# Patient Record
Sex: FEMALE | Race: WHITE
[De-identification: names, ages, dates, MRNs, and addresses within clinical notes are randomized per-mention and may not be internally consistent; named-entity substitution may affect disease eponyms.]

---

## 2017-08-22 ENCOUNTER — HOSPITAL ENCOUNTER (OUTPATIENT)
Dept: HOSPITAL 61 - KCIC MAMMO | Age: 47
Discharge: HOME | End: 2017-08-22
Attending: INTERNAL MEDICINE
Payer: COMMERCIAL

## 2017-08-22 DIAGNOSIS — Z12.31: Primary | ICD-10-CM

## 2017-08-22 PROCEDURE — 77063 BREAST TOMOSYNTHESIS BI: CPT

## 2017-08-24 NOTE — KCIC
DATE: 8/23/2017



EXAM: MAMMO NOHELIA SCREENING BILATERAL



HISTORY: 46-year-old female for routine screening



COMPARISON: 2014 and 2012 mammograms



This study was interpreted with the benefit of Computerized Aided Detection (CAD
).



FINDINGS:



Breast Density: SCATTERED The breast parenchyma shows scattered fibroglandular 
densities. Breast parenchyma level B. 



There is a 5 mm ill-defined nodule within the right upper outer breast in the 
prepectoral soft tissue approximately 12 cm from the nipple. No suspicious 
calcifications.

Left breast show no abnormality.





IMPRESSION: Small ill-defined nodular density within right breast upper outer 
quadrant in the prepectoral soft tissue. Targeted Ultrasound recommended for 
further evaluation.





BI-RADS CATEGORY: 0 INCOMPLETE: NEED ADDITIONAL IMAGING EVAULATION AND/OR PRIOR 
MAMMOGRAMS FOR COMPARISON



RECOMMENDED FOLLOW-UP: 



PQRS compliance statement: Patient information was entered into a reminder 
system with a target due date for the next mammogram.



Mammography is a sensitive method for finding small breast cancers, but it does 
not detect them all and is not a substitute for careful clinical examination. A 
negative mammogram does not negate a clinically suspicious finding and should 
not result in delay in biopsying a clinically suspicious abnormality.



"Our facility is accredited by the [employer] Mammography 
Program."
MTDD

## 2017-08-25 ENCOUNTER — HOSPITAL ENCOUNTER (OUTPATIENT)
Dept: HOSPITAL 61 - US | Age: 47
Discharge: HOME | End: 2017-08-25
Attending: INTERNAL MEDICINE
Payer: COMMERCIAL

## 2017-08-25 DIAGNOSIS — R92.8: Primary | ICD-10-CM

## 2017-08-25 PROCEDURE — 76641 ULTRASOUND BREAST COMPLETE: CPT

## 2017-08-25 NOTE — RAD
Ultrasound of the right breast



Indication: Callback



Technique: Targeted ultrasound of the right breast.



Comparison: Is mammogram from 8/22/2017.



Findings:

No abnormal cystic or solid lesions were identified from 9:00 to 12:00 area on

the ultrasound.



Impression:

No solid or cystic lesions seen on the targeted ultrasound in the region of

abnormality seen on prior mammogram.



BI-RADS 3: Probably benign. Follow-up right breast mammogram in 6 months.

## 2021-07-07 ENCOUNTER — HOSPITAL ENCOUNTER (EMERGENCY)
Dept: HOSPITAL 61 - ER | Age: 51
Discharge: HOME | End: 2021-07-07
Payer: COMMERCIAL

## 2021-07-07 VITALS — WEIGHT: 189.6 LBS | BODY MASS INDEX: 28.73 KG/M2 | HEIGHT: 68 IN

## 2021-07-07 VITALS — SYSTOLIC BLOOD PRESSURE: 150 MMHG | DIASTOLIC BLOOD PRESSURE: 88 MMHG

## 2021-07-07 DIAGNOSIS — Z88.8: ICD-10-CM

## 2021-07-07 DIAGNOSIS — R07.2: Primary | ICD-10-CM

## 2021-07-07 LAB
ALBUMIN SERPL-MCNC: 4 G/DL (ref 3.4–5)
ALBUMIN/GLOB SERPL: 1.1 {RATIO} (ref 1–1.7)
ALP SERPL-CCNC: 55 U/L (ref 46–116)
ALT SERPL-CCNC: 23 U/L (ref 14–59)
ANION GAP SERPL CALC-SCNC: 5 MMOL/L (ref 6–14)
AST SERPL-CCNC: 17 U/L (ref 15–37)
BASOPHILS # BLD AUTO: 0 X10^3/UL (ref 0–0.2)
BASOPHILS NFR BLD: 1 % (ref 0–3)
BILIRUB SERPL-MCNC: 0.3 MG/DL (ref 0.2–1)
BUN SERPL-MCNC: 10 MG/DL (ref 7–20)
BUN/CREAT SERPL: 14 (ref 6–20)
CALCIUM SERPL-MCNC: 9.4 MG/DL (ref 8.5–10.1)
CHLORIDE SERPL-SCNC: 107 MMOL/L (ref 98–107)
CO2 SERPL-SCNC: 33 MMOL/L (ref 21–32)
CREAT SERPL-MCNC: 0.7 MG/DL (ref 0.6–1)
EOSINOPHIL NFR BLD: 0.1 X10^3/UL (ref 0–0.7)
EOSINOPHIL NFR BLD: 1 % (ref 0–3)
ERYTHROCYTE [DISTWIDTH] IN BLOOD BY AUTOMATED COUNT: 14.7 % (ref 11.5–14.5)
GFR SERPLBLD BASED ON 1.73 SQ M-ARVRAT: 88.2 ML/MIN
GLUCOSE SERPL-MCNC: 88 MG/DL (ref 70–99)
HCT VFR BLD CALC: 34.8 % (ref 36–47)
HGB BLD-MCNC: 11.8 G/DL (ref 12–15.5)
LIPASE: 202 U/L (ref 73–393)
LYMPHOCYTES # BLD: 2.1 X10^3/UL (ref 1–4.8)
LYMPHOCYTES NFR BLD AUTO: 44 % (ref 24–48)
MCH RBC QN AUTO: 29 PG (ref 25–35)
MCHC RBC AUTO-ENTMCNC: 34 G/DL (ref 31–37)
MCV RBC AUTO: 86 FL (ref 79–100)
MONO #: 0.3 X10^3/UL (ref 0–1.1)
MONOCYTES NFR BLD: 6 % (ref 0–9)
NEUT #: 2.3 X10^3/UL (ref 1.8–7.7)
NEUTROPHILS NFR BLD AUTO: 48 % (ref 31–73)
PLATELET # BLD AUTO: 308 X10^3/UL (ref 140–400)
POTASSIUM SERPL-SCNC: 4.1 MMOL/L (ref 3.5–5.1)
PROT SERPL-MCNC: 7.5 G/DL (ref 6.4–8.2)
RBC # BLD AUTO: 4.06 X10^6/UL (ref 3.5–5.4)
SODIUM SERPL-SCNC: 145 MMOL/L (ref 136–145)
WBC # BLD AUTO: 4.8 X10^3/UL (ref 4–11)

## 2021-07-07 PROCEDURE — 83880 ASSAY OF NATRIURETIC PEPTIDE: CPT

## 2021-07-07 PROCEDURE — 80053 COMPREHEN METABOLIC PANEL: CPT

## 2021-07-07 PROCEDURE — 85025 COMPLETE CBC W/AUTO DIFF WBC: CPT

## 2021-07-07 PROCEDURE — 96375 TX/PRO/DX INJ NEW DRUG ADDON: CPT

## 2021-07-07 PROCEDURE — 71045 X-RAY EXAM CHEST 1 VIEW: CPT

## 2021-07-07 PROCEDURE — 93005 ELECTROCARDIOGRAM TRACING: CPT

## 2021-07-07 PROCEDURE — 96374 THER/PROPH/DIAG INJ IV PUSH: CPT

## 2021-07-07 PROCEDURE — 84484 ASSAY OF TROPONIN QUANT: CPT

## 2021-07-07 PROCEDURE — 83690 ASSAY OF LIPASE: CPT

## 2021-07-07 PROCEDURE — 99285 EMERGENCY DEPT VISIT HI MDM: CPT

## 2021-07-07 PROCEDURE — 36415 COLL VENOUS BLD VENIPUNCTURE: CPT

## 2021-07-07 NOTE — EKG
[facility]

              [address] [place] [address]

Test Date:    2021               Test Time:    11:01:38

Pat Name:     CLAUDIA SPENCER            Department:   

Patient ID:   [id number]           Room:          

Gender:       F                        Technician:   

:          1970               Requested By: KRISTAL JEAN

Order Number: [id number]           Reading MD:   Syed Griffin

                                 Measurements

Intervals                              Axis          

Rate:         61                       P:            52

GA:           170                      QRS:          18

QRSD:         72                       T:            31

QT:           426                                    

QTc:          430                                    

                           Interpretive Statements

SINUS RHYTHM

LOW LIMB LEAD VOLTAGE

NON SPECIFIC T ABNORMALITY

BORDERLINE ECG

Electronically Signed On 2021 11:44:11 CDT by Syed Griffin

## 2021-07-07 NOTE — EKG
[facility]

              [address] [place] [address]

Test Date:    2021               Test Time:    10:23:44

Pat Name:     CLAUDIA SPENCER            Department:   

Patient ID:   [id number]           Room:          

Gender:       F                        Technician:   

:          1970               Requested By: KRISTAL JEAN

Order Number: [id number]           Reading MD:   Syed Griffin

                                 Measurements

Intervals                              Axis          

Rate:         63                       P:            43

RI:           170                      QRS:          24

QRSD:         72                       T:            42

QT:           424                                    

QTc:          437                                    

                           Interpretive Statements

SINUS RHYTHM

LOW LIMB LEAD VOLTAGE

NON SPECIFIC T ABNORMALITY

BORDERLINE ECG

RI6.02

No previous ECG available for comparison

Electronically Signed On 2021 11:44:50 CDT by Syed Griffin

## 2021-07-07 NOTE — RAD
INDICATION: Reason: Pain of the abdomen/ Spl. Instructions:  / History: 



COMPARISON: None.



FINDINGS:



Single view of chest obtained.

No focal airspace consolidation.  

Cardiomediastinal contour unremarkable.



No acute osseous abnormality.





IMPRESSION:



*  No focal airspace consolidation or edema.



Electronically signed by: Enmanuel Ortiz MD (7/7/2021 11:06 AM) [id number]

## 2021-07-07 NOTE — ED.ADGEN
General Adult


EDM:


Chief Complaint:  CHEST PAIN





HPI:


HPI:





Patient is a 50-year-old female who arrives ambulatory to the emergency 

department complaining of midsternal chest pain which radiates through to her 

back.  Patient reports this has been ongoing since yesterday and happens 

intermittently.  Patient describes this as a muscle spasm which comes and goes 

and is not necessarily impacted by any particular activity.  The patient states 

she has had muscle spasms before however she has had nothing like this in her 

chest.  The patient states when it does happen it is very severe in nature and 

takes her breath away.  The patient denies any history of recent illness.  She 

further denies any history of coronary artery disease.  She is awake, alert and 

nontoxic-appearing.





Review of Systems:


Review of Systems:


Constitutional:   Denies fever or chills. []


Eyes:   Denies change in visual acuity. []


HENT:   Denies nasal congestion or sore throat. [] 


Respiratory:   Denies cough or shortness of breath. [] 


Cardiovascular:   Reports chest pain.  Denies edema. [] 


GI:   Denies abdominal pain, nausea, vomiting, bloody stools or diarrhea. [] 


:  Denies dysuria. [] 


Musculoskeletal:   Reports thoracic back pain.   [] 


Integument:   Denies rash. [] 


Neurologic:   Denies headache, focal weakness or sensory changes. [] 


Endocrine:   Denies polyuria or polydipsia. [] 


Lymphatic:  Denies swollen glands. [] 


Psychiatric:  Denies depression or anxiety. []





Current Medications:





Current Medications








 Medications


  (Trade)  Dose


 Ordered  Sig/[facility]  Start Time


 Stop Time Status Last Admin


Dose Admin


 


 Aspirin


  (Aspirin


 Chewable)  324 mg  1X  ONCE  21 11:00


 21 11:01 DC 21 11:25


324 MG











Allergies:


Allergies:





Allergies








Coded Allergies Type Severity Reaction Last Updated Verified


 


  fluconazole Allergy Intermediate Hives 21 Yes











Physical Exam:


PE:





Constitutional: Well developed, well nourished, no acute distress, non-toxic 

appearance. []


HENT: Normocephalic, atraumatic, bilateral external ears normal, oropharynx 

moist, no oral exudates, nose normal. []


Eyes: PERRLA, EOMI, conjunctiva normal, no discharge. [] 


Neck: Normal range of motion, no tenderness, supple, no stridor. [] 


Cardiovascular:Heart rate regular rhythm, no murmur []


Lungs & Thorax:  Bilateral breath sounds clear to auscultation []


Abdomen: Bowel sounds normal, soft, no tenderness, no masses, no pulsatile 

masses. [] 


Skin: Warm, dry, no erythema, no rash. [] 


Back: No tenderness, no CVA tenderness. [] 


Extremities: No tenderness, no cyanosis, no clubbing, ROM intact, no edema. [] 


Neurologic: Alert and oriented X 3, normal motor function, normal sensory f

unction, no focal deficits noted. []


Psychologic: Affect normal, judgement normal, mood normal. []





Current Patient Data:


Labs:





                                Laboratory Tests








Test


 21


10:50 21


10:58


 


White Blood Count


 4.8 x10^3/uL


(4.0-11.0) 





 


Red Blood Count


 4.06 x10^6/uL


(3.50-5.40) 





 


Hemoglobin


 11.8 g/dL


(12.0-15.5)  L 





 


Hematocrit


 34.8 %


(36.0-47.0)  L 





 


Mean Corpuscular Volume


 86 fL ()


 





 


Mean Corpuscular Hemoglobin 29 pg (25-35)   


 


Mean Corpuscular Hemoglobin


Concent 34 g/dL


(31-37) 





 


Red Cell Distribution Width


 14.7 %


(11.5-14.5)  H 





 


Platelet Count


 308 x10^3/uL


(140-400) 





 


Neutrophils (%) (Auto) 48 % (31-73)   


 


Lymphocytes (%) (Auto) 44 % (24-48)   


 


Monocytes (%) (Auto) 6 % (0-9)   


 


Eosinophils (%) (Auto) 1 % (0-3)   


 


Basophils (%) (Auto) 1 % (0-3)   


 


Neutrophils # (Auto)


 2.3 x10^3/uL


(1.8-7.7) 





 


Lymphocytes # (Auto)


 2.1 x10^3/uL


(1.0-4.8) 





 


Monocytes # (Auto)


 0.3 x10^3/uL


(0.0-1.1) 





 


Eosinophils # (Auto)


 0.1 x10^3/uL


(0.0-0.7) 





 


Basophils # (Auto)


 0.0 x10^3/uL


(0.0-0.2) 





 


Sodium Level


 145 mmol/L


(136-145) 





 


Potassium Level


 4.1 mmol/L


(3.5-5.1) 





 


Chloride Level


 107 mmol/L


() 





 


Carbon Dioxide Level


 33 mmol/L


(21-32)  H 





 


Anion Gap 5 (6-14)  L 


 


Blood Urea Nitrogen


 10 mg/dL


(7-20) 





 


Creatinine


 0.7 mg/dL


(0.6-1.0) 





 


Estimated GFR


(Cockcroft-Gault) 88.2  


 





 


BUN/Creatinine Ratio 14 (6-20)   


 


Glucose Level


 88 mg/dL


(70-99) 





 


Calcium Level


 9.4 mg/dL


(8.5-10.1) 





 


Total Bilirubin


 0.3 mg/dL


(0.2-1.0) 





 


Aspartate Amino Transferase


(AST) 17 U/L (15-37)


 





 


Alanine Aminotransferase (ALT)


 23 U/L (14-59)


 





 


Alkaline Phosphatase


 55 U/L


() 





 


Troponin I Quantitative


 < 0.017 ng/mL


(0.000-0.055) 





 


NT-Pro-B-Type Natriuretic


Peptide 59 pg/mL


(0-124) 





 


Total Protein


 7.5 g/dL


(6.4-8.2) 





 


Albumin


 4.0 g/dL


(3.4-5.0) 





 


Albumin/Globulin Ratio 1.1 (1.0-1.7)   


 


Lipase


 202 U/L


() 





 


POC Troponin I


 


 0.00 ng/ml


(<0.08)





                                Laboratory Tests


21 10:50








                                Laboratory Tests


21 10:50








Vital Signs:





                                   Vital Signs








  Date Time  Temp Pulse Resp B/P (MAP) Pulse Ox O2 Delivery O2 Flow Rate FiO2


 


21 10:57 98.5 67 20 152/88 97 Room Air  





 98.5       











EKG:


EKG:


[] EKG was obtained at 10:23 AM and revealed a normal sinus rhythm with a 

ventricular rate of 63 bpm.  There are no acute ST/T wave changes to denote 

ischemia.





Repeat EKG was performed at 11:01 AM and revealed a normal sinus rhythm with a 

ventricular rate of 61 bpm.  There are no acute ST/T wave changes to denote 

ischemia.





Heart Score:


C/O Chest Pain:  Yes


HEART Score for Chest Pain:  








HEART Score for Chest Pain Response (Comments) Value


 


History Slighlty/Non-Suspicious 0


 


ECG Normal 0


 


Age >45 - < 65 1


 


Risk Factors                            1 or 2 Risk Factors 1


 


Troponin < Normal Limit 0


 


Total  2








Risk Factors:


Risk Factors:  DM, Current or recent (<one month) smoker, HTN, HLP, family hist

ory of CAD, obesity.


Risk Scores:


Score 0 - 3:  2.5% MACE over next 6 weeks - Discharge Home


Score 4 - 6:  20.3% MACE over next 6 weeks - Admit for Clinical Observation


Score 7 - 10:  72.7% MACE over next 6 weeks - Early Invasive Strategies





Radiology/Procedures:


Radiology/Procedures:


[]


Impression:


[facility]


                    [address] [place] [address]


                                 [phone]


                                        


                                 IMAGING REPORT





                                     Signed





PATIENT: CLAUDIA SPENCER    ACCOUNT: [id number]     MRN#: [id number]


: 1970           LOCATION: ER              AGE: 50


SEX: F                    EXAM DT: 21         ACCESSION#: [id number]


STATUS: PRE ER            ORD. PHYSICIAN: KRISTAL JEAN DO


REASON: Pain


PROCEDURE: PORTABLE CHEST 1V








INDICATION: Reason: Pain of the abdomen/ Spl. Instructions:  / History: 





COMPARISON: None.





FINDINGS:





Single view of chest obtained.


No focal airspace consolidation.  


Cardiomediastinal contour unremarkable.





No acute osseous abnormality.








IMPRESSION:





*  No focal airspace consolidation or edema.





Electronically signed by: Enmanuel Sumner MD (2021 11:06 AM) [id number]














DICTATED and SIGNED BY:     ENMANUEL SUMNER MD


DATE:     21 [id number]








Course & Med Decision Making:


Course & Med Decision Making


Pertinent Labs and Imaging studies reviewed. (See chart for details)





[]





Dragon Disclaimer:


Dragon Disclaimer:


This electronic medical record was generated, in whole or in part, using a voice

 recognition dictation system.





Departure


Departure


Impression:  


   Primary Impression:  


   Atypical chest pain


Disposition:   HOME / SELF CARE / HOMELESS


Condition:  STABLE


Referrals:  


SONIA MORALES MD (PCP)


Patient Instructions:  Chest Pain (Nonspecific)





Additional Instructions:  


The patient remains awake, alert and in no acute distress.  The patient is 

without pain at this time.  After further discussion with the patient she states

 this has been ongoing since 10:00 yesterday.  This effectively rules out any 

acute cardiac condition given that the patient's EKG does not demonstrate ischem

ia nor are there any changes in her cardiac enzymes.  Moreover the patient does 

relate to me that this has been more affected with movement.  The patient also 

states that she has a history of having had a gastric sleeve and wonders if this

 may be in conjunction what may be causing her symptoms.  I have advised that 

she follow-up with her primary care physician and consider GI consultation with 

respect to her pain.  It is very possible the patient may suffer from either 

musculoskeletal condition or even esophageal spasms.  Should the patient develop

 any new shortness of air or chest pain of advised her to return.  The patient 

understands and has agreed to do so.  She is nontoxic-appearing and stable for 

discharge.


Scripts


Cyclobenzaprine Hcl (CYCLOBENZAPRINE HCL) 10 Mg Tablet


1 TAB PO Q12HR for 5 Days, #10 TAB


   Prov: KRISTAL JEAN DO         21 


Tramadol Hcl (ULTRAM) 50 Mg Tablet


50 MG PO Q6HRS PRN for PAIN for 3 Days, #12 TAB 0 Refills


   Prov: KRISTAL JEAN DO         21











KRISTAL JEAN DO                2021 10:43